# Patient Record
Sex: FEMALE | Race: WHITE | NOT HISPANIC OR LATINO | Employment: FULL TIME | URBAN - METROPOLITAN AREA
[De-identification: names, ages, dates, MRNs, and addresses within clinical notes are randomized per-mention and may not be internally consistent; named-entity substitution may affect disease eponyms.]

---

## 2018-07-06 ENCOUNTER — OFFICE VISIT (OUTPATIENT)
Dept: OPTOMETRY | Facility: CLINIC | Age: 33
End: 2018-07-06
Payer: COMMERCIAL

## 2018-07-06 DIAGNOSIS — H52.01 HYPERMETROPIA OF RIGHT EYE: Primary | ICD-10-CM

## 2018-07-06 DIAGNOSIS — Z98.890 HISTORY OF SURGICAL PROCEDURE ON EYE PROPER USING LASER: ICD-10-CM

## 2018-07-06 DIAGNOSIS — H52.222 REGULAR ASTIGMATISM OF LEFT EYE: ICD-10-CM

## 2018-07-06 DIAGNOSIS — H52.12 MYOPIA OF LEFT EYE: ICD-10-CM

## 2018-07-06 DIAGNOSIS — H04.123 INSUFFICIENCY OF TEAR FILM OF BOTH EYES: ICD-10-CM

## 2018-07-06 PROCEDURE — 92014 COMPRE OPH EXAM EST PT 1/>: CPT | Performed by: OPTOMETRIST

## 2018-07-06 PROCEDURE — 92015 DETERMINE REFRACTIVE STATE: CPT | Performed by: OPTOMETRIST

## 2018-07-06 ASSESSMENT — CUP TO DISC RATIO
OD_RATIO: 0.3
OS_RATIO: 0.3

## 2018-07-06 ASSESSMENT — REFRACTION_WEARINGRX
OS_CYLINDER: SPHERE
OD_AXIS: 070
OD_CYLINDER: +2.75
OD_SPHERE: -1.00
OS_SPHERE: PLANO

## 2018-07-06 ASSESSMENT — VISUAL ACUITY
OD_SC+: -1
OD_SC: 20/40
OS_SC+: -1
OD_PH_SC: 20/40
OD_SC: 20/70
OS_SC: 20/25
METHOD: SNELLEN - LINEAR
OS_SC: 20/20

## 2018-07-06 ASSESSMENT — SLIT LAMP EXAM - LIDS
COMMENTS: NORMAL
COMMENTS: NORMAL

## 2018-07-06 ASSESSMENT — REFRACTION_MANIFEST
OS_CYLINDER: +0.75
OS_SPHERE: -1.50
OS_AXIS: 138
OD_SPHERE: +1.75

## 2018-07-06 ASSESSMENT — EXTERNAL EXAM - RIGHT EYE: OD_EXAM: NORMAL

## 2018-07-06 ASSESSMENT — TONOMETRY
OD_IOP_MMHG: 15
OS_IOP_MMHG: 15
IOP_METHOD: TONOPEN

## 2018-07-06 ASSESSMENT — CONF VISUAL FIELD
OD_NORMAL: 1
OS_NORMAL: 1

## 2018-07-06 ASSESSMENT — EXTERNAL EXAM - LEFT EYE: OS_EXAM: NORMAL

## 2018-07-06 NOTE — PROGRESS NOTES
Chief Complaint   Patient presents with     COMPREHENSIVE EYE EXAM     1 year post op ptk       History of RCE right eye with PTK- 1 year ago  Last Eye Exam: 1.5 years  Dilated Previously: Yes    What are you currently using to see?  does not use glasses or contacts       Distance Vision Acuity: Noticed gradual change in both eyes    Near Vision Acuity: Not satisfied,od eye    Eye Comfort: dry od needs drops every hour  Do you use eye drops? : Yes: otc gel  Occupation or Hobbies: RN [employer]    Mere Forbes Optometric Assistant, A.B.O.C.          Medical, surgical and family histories reviewed and updated 7/6/2018.       OBJECTIVE: See Ophthalmology exam    ASSESSMENT:    ICD-10-CM    1. Hypermetropia of right eye H52.01 REFRACTION   2. Regular astigmatism of left eye H52.222 REFRACTION   3. Myopia of left eye H52.12 REFRACTION   4. History of surgical procedure on eye proper using laser Z98.890 EYE EXAM (SIMPLE-NONBILLABLE)   5. Insufficiency of tear film of both eyes H04.123 EYE EXAM (SIMPLE-NONBILLABLE)     cycloSPORINE (RESTASIS) 0.05 % ophthalmic emulsion      PLAN:     Patient Instructions   Eyeglass prescription given.  Give the glasses 1-2 weeks to adjust to the new prescription.  You may get headaches or eyestrain as your eyes get used to the new prescription.  Sometimes the symptoms get worse before it gets better.  If any problems after 1-2 weeks schedule an appointment for a recheck.      Referral to Dr. Fuchs at [facility] for evaluation right eye if interested in any refractive surgery right eye.    Use 1 drop of Restasis in both eyes in the morning and 1 drop both eyes in the evening.  The drop is cloudy colored and will make your vision a little blurrry after insertion.  The drop may sting a little when you put it in which is normal for some people.  Do not touch the tip of the dropper to anything which contaminates the drop.    Continue using gel drops as  needed.    Return in 2 months for recheck on dry eye or sooner if needed.    Return in 1 year for a complete eye exam or sooner if needed.    Keith Mcmillan, OD

## 2018-07-06 NOTE — MR AVS SNAPSHOT
After Visit Summary   7/6/2018    Promise Conrad    MRN: [id number]           Patient Information     Date Of Birth          1985        Visit Information        Provider Department      7/6/2018 2:40 PM Keith Mcmillan, CIRA [facility]        Today's Diagnoses     Hypermetropia of right eye    -  1    Regular astigmatism of left eye        Myopia of left eye        History of surgical procedure on eye proper using laser        Insufficiency of tear film of both eyes          Care Instructions    Eyeglass prescription given.  Give the glasses 1-2 weeks to adjust to the new prescription.  You may get headaches or eyestrain as your eyes get used to the new prescription.  Sometimes the symptoms get worse before it gets better.  If any problems after 1-2 weeks schedule an appointment for a recheck.      Referral to Dr. Fuchs at [facility] for evaluation right eye if interested in any refractive surgery right eye.    Use 1 drop of Restasis in both eyes in the morning and 1 drop both eyes in the evening.  The drop is cloudy colored and will make your vision a little blurrry after insertion.  The drop may sting a little when you put it in which is normal for some people.  Do not touch the tip of the dropper to anything which contaminates the drop.    Continue using gel drops as needed.    Return in 2 months for recheck on dry eye or sooner if needed.    Return in 1 year for a complete eye exam or sooner if needed.    Keith Mcmillan, CIRA      The affects of the dilating drops last for 4- 6 hours.  You will be more sensitive to light and vision will be blurry up close.  Mydriatic sunglasses were given if needed.      Optometry Providers       Clinic Locations                                 Telephone Number   [facility]  [phone]     [facility] Hours:                [facility] [place] [facility] Hours:       [facility] Hours:   [address]   [address]     [address] [facility]  [place] [address]   [place] [address]    [place] [address]  Phone: [phone]                    Phone: [phone]     Phone: [phone]                      Monday 8:00-7:00                          Monday 8:00-7:00                          Monday 8:00-7:00              Tuesday 8:00-6:00                          Tuesday 8:00-7:00                          Tuesday 8:00-7:00              Wednesday 8:00-6:00                  Wednesday 8:00-7:00                   Wednesday 8:00-7:00      Thursday 8:00-6:00                        Thursday 8:00-7:00                         Thursday 8:00-7:00            Friday 8:00-5:00                              Friday 8:00-5:00                              Friday 8:00-5:00    [facility] Hours:   [address] [place] [address]  [phone]    Monday 8:00-7:00  Tuesday 8:00-7:00  Wednesday 8:00-7:00  Thursday 8:00-7:00  Friday 8:00-5:00  Please log on to [email or web address] to order your contact lenses.  The link is found on the Eye Care and Vision Services page.  As always, Thank you for trusting us with your health care needs!            Follow-ups after your visit        Follow-up notes from your care team     Return in about 2 months (around 9/6/2018), or if symptoms worsen or fail to improve, for Follow Up.      Who to contact     If you have questions or need follow up information about today's clinic visit or your schedule please contact [facility] directly at [phone].  Normal or non-critical lab and imaging results will be communicated to you by MyChart, letter or phone within 4 business days after the clinic has received the results. If you do not hear from us within 7 days, please contact the clinic through MyChart or phone. If you have a critical or  abnormal lab result, we will notify you by phone as soon as possible.  Submit refill requests through ZummZumm or call your pharmacy and they will forward the refill request to us. Please allow 3 business days for your refill to be completed.          Additional Information About Your Visit        ZummZumm Information     ZummZumm is an electronic gateway that provides easy, online access to your medical records. With ZummZumm, you can request a clinic appointment, read your test results, renew a prescription or communicate with your care team.     To sign up for [employer]umm[employer]umm visit the website at [email or web address]   You will be asked to enter the access code listed below, as well as some personal information. Please follow the directions to create your username and password.     Your access code is: [email or web address]  Expires: 10/7/2018  8:56 AM     Your access code will  in 90 days. If you need help or a new code, please contact your [facility] or call [phone] for assistance.        Care EveryWhere ID     This is your Care EveryWhere ID. This could be used by other organizations to access your [place] medical records  [id number]         Blood Pressure from Last 3 Encounters:   14 120/75   14 128/78   14 112/74    Weight from Last 3 Encounters:   14 82.6 kg (182 lb)   14 90.3 kg (199 lb)   14 89.8 kg (198 lb)              We Performed the Following     EYE EXAM (SIMPLE-NONBILLABLE)     REFRACTION          Today's Medication Changes          These changes are accurate as of 18 11:59 PM.  If you have any questions, ask your nurse or doctor.               Start taking these medicines.        Dose/Directions    cycloSPORINE 0.05 % ophthalmic emulsion   Commonly known as:  RESTASIS   Used for:  Insufficiency of tear film of both eyes   Started by:  Keith Mcmillan, OD        Dose:  1 drop   Place 1 drop into both eyes 2 times daily   Quantity:   2 Box   Refills:  11            Where to get your medicines      These medications were sent to [facility] - [place], [place] - [address] AT [facility] [address]  [address], [place] [facility] [address]     Phone:  [phone]     cycloSPORINE 0.05 % ophthalmic emulsion                Primary Care Provider Office Phone # Fax #    Alexsander Celaya [phone]       [facility] [address] [place] [address]        Equal Access to Services     [facility]: Hadii aad ku hadasho Soomaali, waaxda luqadaha, qaybta kaalmada adeegyada, waxay alan hayaan india echevarria . So [facility] [phone].    ATENCIÓN: Si habla español, tiene a castro disposición servicios gratuitos de asistencia lingüística. [facility] [phone].    We comply with applicable federal civil rights laws and [place] laws. We do not discriminate on the basis of race, color, national origin, age, disability, sex, sexual orientation, or gender identity.            Thank you!     Thank you for choosing [facility]  for your care. Our goal is always to provide you with excellent care. Hearing back from our patients is one way we can continue to improve our services. Please take a few minutes to complete the written survey that you may receive in the mail after your visit with us. Thank you!             Your Updated Medication List - Protect others around you: Learn how to safely use, store and throw away your medicines at www.disposemymeds.org.          This list is accurate as of 7/6/18 11:59 PM.  Always use your most recent med list.                   Brand Name Dispense Instructions for use Diagnosis    Calcium 200 MG Tabs      Take 2 tablets by mouth 2 times daily        cycloSPORINE 0.05 % ophthalmic emulsion    RESTASIS    2 Box    Place 1 drop into both eyes 2 times daily    Insufficiency of tear film of both eyes       multivitamin, therapeutic with minerals Tabs  tablet      Take 1 tablet by mouth daily        sodium chloride 2 % ophthalmic solution    BHAVNA 128    1 Bottle    Place 1 drop into the right eye 4 times daily as needed for dry eyes    Recurrent erosion of cornea, right       ZANTAC 150 MAXIMUM STRENGTH PO      Take 1 tablet by mouth 2 times daily

## 2018-07-09 RX ORDER — CYCLOSPORINE 0.5 MG/ML
1 EMULSION OPHTHALMIC 2 TIMES DAILY
Qty: 2 BOX | Refills: 11 | Status: SHIPPED | OUTPATIENT
Start: 2018-07-09 | End: 2019-07-09

## 2018-07-09 NOTE — PATIENT INSTRUCTIONS
Eyeglass prescription given.  Give the glasses 1-2 weeks to adjust to the new prescription.  You may get headaches or eyestrain as your eyes get used to the new prescription.  Sometimes the symptoms get worse before it gets better.  If any problems after 1-2 weeks schedule an appointment for a recheck.      Referral to Dr. Fuchs at [facility] for evaluation right eye if interested in any refractive surgery right eye.    Use 1 drop of Restasis in both eyes in the morning and 1 drop both eyes in the evening.  The drop is cloudy colored and will make your vision a little blurrry after insertion.  The drop may sting a little when you put it in which is normal for some people.  Do not touch the tip of the dropper to anything which contaminates the drop.    Continue using gel drops as needed.    Return in 2 months for recheck on dry eye or sooner if needed.    Return in 1 year for a complete eye exam or sooner if needed.    Keith Mcmillan, OD      The affects of the dilating drops last for 4- 6 hours.  You will be more sensitive to light and vision will be blurry up close.  Mydriatic sunglasses were given if needed.      Optometry Providers       Clinic Locations                                 Telephone Number   Dr. Ольга Adan [place]   [place]    [place]   [place]   [place] [phone]     [facility] [facility] Hours:                [facility] [place] [facility] Hours:       [facility] Hours:   [address]   [address] [address]     [address] [facility] [place] [address]   [place] [address]    [place] [address]  Phone: [phone]                    Phone: [phone]     Phone: [phone]                      Monday 8:00-7:00                          Monday 8:00-7:00                          Monday 8:00-7:00              Tuesday 8:00-6:00                          Tuesday 8:00-7:00                           Tuesday 8:00-7:00              Wednesday 8:00-6:00                  Wednesday 8:00-7:00                   Wednesday 8:00-7:00      Thursday 8:00-6:00                        Thursday 8:00-7:00                         Thursday 8:00-7:00            Friday 8:00-5:00                              Friday 8:00-5:00                              Friday 8:00-5:00    [facility] Hours:   [address] [place] [address]  [phone]    Monday 8:00-7:00  Tuesday 8:00-7:00  Wednesday 8:00-7:00  Thursday 8:00-7:00  Friday 8:00-5:00  Please log on to [email or web address] to order your contact lenses.  The link is found on the Eye Care and Vision Services page.  As always, Thank you for trusting us with your health care needs!

## 2021-11-15 ENCOUNTER — PATIENT OUTREACH (OUTPATIENT)
Dept: CARE COORDINATION | Facility: CLINIC | Age: 36
End: 2021-11-15

## 2021-11-15 NOTE — PROGRESS NOTES
"Clinical Product Navigator RN reviewed chart; patient on payer product coverage, received request for Care Coordination referral due to : \"Neoplasm of unspecified behavior of brain\".  Review results: Patient has not had any recent (> 3 years) care with any [facility] providers.      Will notify health plan CM team of above with request to outreach; if patient desires to establish care within [facility], this writer is available to assist.       Kayla Palomo RN/Clinical Product Navigator    "

## 2023-04-30 ENCOUNTER — HEALTH MAINTENANCE LETTER (OUTPATIENT)
Age: 38
End: 2023-04-30

## 2023-10-16 ENCOUNTER — OFFICE VISIT (OUTPATIENT)
Dept: OPTOMETRY | Facility: CLINIC | Age: 38
End: 2023-10-16
Payer: COMMERCIAL

## 2023-10-16 DIAGNOSIS — Z01.00 EXAMINATION OF EYES AND VISION: Primary | ICD-10-CM

## 2023-10-16 DIAGNOSIS — H52.12 MYOPIA OF LEFT EYE: ICD-10-CM

## 2023-10-16 DIAGNOSIS — G93.0 EPIDERMOID CYST OF BRAIN: ICD-10-CM

## 2023-10-16 DIAGNOSIS — H52.222 REGULAR ASTIGMATISM OF LEFT EYE: ICD-10-CM

## 2023-10-16 DIAGNOSIS — H52.01 HYPEROPIA OF RIGHT EYE: ICD-10-CM

## 2023-10-16 DIAGNOSIS — H02.889 MEIBOMIAN GLAND DYSFUNCTION: ICD-10-CM

## 2023-10-16 DIAGNOSIS — Z98.890 HISTORY OF PHOTOTHERAPEUTIC KERATECTOMY: ICD-10-CM

## 2023-10-16 PROCEDURE — 92004 COMPRE OPH EXAM NEW PT 1/>: CPT | Performed by: OPTOMETRIST

## 2023-10-16 PROCEDURE — 92015 DETERMINE REFRACTIVE STATE: CPT | Performed by: OPTOMETRIST

## 2023-10-16 ASSESSMENT — KERATOMETRY
OS_AXISANGLE2_DEGREES: 013
OS_K2POWER_DIOPTERS: 43.75
OD_K1POWER_DIOPTERS: 40.75
OD_AXISANGLE_DEGREES: 072
OS_AXISANGLE_DEGREES: 103
OD_K2POWER_DIOPTERS: 41.75
OD_AXISANGLE2_DEGREES: 162
OS_K1POWER_DIOPTERS: 43.00

## 2023-10-16 ASSESSMENT — CONF VISUAL FIELD
OD_INFERIOR_TEMPORAL_RESTRICTION: 0
OS_INFERIOR_TEMPORAL_RESTRICTION: 0
OS_SUPERIOR_NASAL_RESTRICTION: 0
OD_SUPERIOR_NASAL_RESTRICTION: 0
OS_SUPERIOR_TEMPORAL_RESTRICTION: 0
OD_NORMAL: 1
OS_INFERIOR_NASAL_RESTRICTION: 0
OS_NORMAL: 1
OD_INFERIOR_NASAL_RESTRICTION: 0
OD_SUPERIOR_TEMPORAL_RESTRICTION: 0

## 2023-10-16 ASSESSMENT — VISUAL ACUITY
OS_CC: 20/20
OS_SC+: -1
CORRECTION_TYPE: GLASSES
METHOD: SNELLEN - LINEAR
OS_CC: 20/20
OD_CC: 20/25
OD_SC: 20/40
OD_CC: 20/20
OS_SC: 20/40

## 2023-10-16 ASSESSMENT — REFRACTION_WEARINGRX
OS_SPHERE: -1.50
OS_AXIS: 138
OD_SPHERE: +1.75
OS_CYLINDER: +0.75
SPECS_TYPE: SVL

## 2023-10-16 ASSESSMENT — EXTERNAL EXAM - LEFT EYE: OS_EXAM: NORMAL

## 2023-10-16 ASSESSMENT — REFRACTION_MANIFEST
OS_SPHERE: -1.50
OD_SPHERE: +1.75
OD_CYLINDER: SPHERE
OS_AXIS: 116
OS_CYLINDER: +0.50
METHOD_AUTOREFRACTION: 1

## 2023-10-16 ASSESSMENT — TONOMETRY
OD_IOP_MMHG: 17
OS_IOP_MMHG: 21
IOP_METHOD: TONOPEN

## 2023-10-16 ASSESSMENT — CUP TO DISC RATIO
OS_RATIO: 0.15
OD_RATIO: 0.15

## 2023-10-16 ASSESSMENT — EXTERNAL EXAM - RIGHT EYE: OD_EXAM: NORMAL

## 2023-10-16 ASSESSMENT — SLIT LAMP EXAM - LIDS
COMMENTS: MEIBOMIAN GLAND DYSFUNCTION
COMMENTS: MEIBOMIAN GLAND DYSFUNCTION

## 2023-10-16 NOTE — PROGRESS NOTES
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 Stable enhancement along the margins of the surgical tunnel consistent with granulation tissue.  Compared to the more recent exam of 10/12/2022, stable size and appearance of the residual epidermoid cyst within the posterior aspect of 3rd ventricle measuring 3.2 x 3.3 cm (transverse by AP; series 7, image 16). Again noted is corresponding restricted diffusion. This mass extends inferiorly into the posterior aspect of the right quadrigeminal cistern and right ambient cisterns. Stable asymmetric dilatation of the right lateral ventricle.  No new abnormal enhancement or enhancing lesions.\"     Last Eye Exam: 7-6-2018   Dilated Previously: Yes    What are you currently using to see?  glasses       Distance Vision Acuity: Satisfied with vision    Near Vision Acuity: Satisfied with vision while reading  with glasses    Eye Comfort: good  Do you use eye drops? : Yes: evaristo 128 at night, uses moisture goggles at night, Systane/Ocusoft wipes.  Artificial tears as needed- sometimes 3 x day sometimes 15.  Occupation or Hobbies: RN     History of RCE and PTK right eye 9/15/2016- Dr. Finesse Forbes Optometric Assistant, A.B.O.C.      Medical, surgical and family histories reviewed and updated 10/16/2023.       OBJECTIVE: See Ophthalmology exam    ASSESSMENT:    ICD-10-CM    1. Examination of eyes and vision  Z01.00 EYE EXAM (SIMPLE-NONBILLABLE)      2. Hyperopia of right eye  H52.01 REFRACTION      3. " Myopia of left eye  H52.12 REFRACTION      4. Regular astigmatism of left eye  H52.222 REFRACTION      5. Epidermoid cyst of brain  G93.0 EYE EXAM (SIMPLE-NONBILLABLE)     Adult Eye  Referral      6. History of phototherapeutic keratectomy  Z98.890 EYE EXAM (SIMPLE-NONBILLABLE)      7. Meibomian gland dysfunction  H02.889 EYE EXAM (SIMPLE-NONBILLABLE)          PLAN:     Patient Instructions   Eyeglass prescription given.  No change in eyeglass prescription.    Referral to Dr. Otoniel De Oliveira at the [facility] for baseline visual field.  Ok to follow at [place] for annual eye exams   and visual field.    Heat to the eyes daily for 10-15 minutes nightly with warm washcloth or reusable gel masks from the pharmacy or  Grapeshot heat masks can be purchased at Amazon.    Axel ointment at night right eye.    Non preserved artificial tears- 1 drop both eyes 4 x day.    Ocusoft Hypochlor- spray solution onto cotton pad.  Close eyes and gently apply to eyelids and eyelashes using side to side motion.  Use morning and evening.    Keith Mcmillan, OD

## 2023-10-16 NOTE — LETTER
"    10/16/2023         RE: Promise Conrad  [address]  [facility] [address]        Dear Colleague,    Thank you for referring your patient, Promise Conrad, to the [facility]. Please see a copy of my visit note below.    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 Stable enhancement along the margins of the surgical tunnel consistent with granulation tissue.  Compared to the more recent exam of 10/12/2022, stable size and appearance of the residual epidermoid cyst within the posterior aspect of 3rd ventricle measuring 3.2 x 3.3 cm (transverse by AP; series 7, image 16). Again noted is corresponding restricted diffusion. This mass extends inferiorly into the posterior aspect of the right quadrigeminal cistern and right ambient cisterns. Stable asymmetric dilatation of the right lateral ventricle.  No new abnormal enhancement or enhancing lesions.\"     Last Eye Exam: 7-6-2018   Dilated Previously: Yes    What are you currently using to see?  glasses       Distance Vision Acuity: Satisfied with vision    Near Vision Acuity: Satisfied with vision while reading  with glasses    Eye Comfort: good  Do you use eye drops? : Yes: evaristo 128 at night, uses moisture goggles at night, Systane/Ocusoft wipes.  Artificial tears as needed- sometimes 3 x day sometimes 15.  Occupation or Hobbies: RN     History of RCE and PTK right eye 9/15/2016- Dr. Finesse Forbes Optometric Assistant, A.B.O.C.      Medical, " surgical and family histories reviewed and updated 10/16/2023.       OBJECTIVE: See Ophthalmology exam    ASSESSMENT:    ICD-10-CM    1. Examination of eyes and vision  Z01.00 EYE EXAM (SIMPLE-NONBILLABLE)      2. Hyperopia of right eye  H52.01 REFRACTION      3. Myopia of left eye  H52.12 REFRACTION      4. Regular astigmatism of left eye  H52.222 REFRACTION      5. Epidermoid cyst of brain  G93.0 EYE EXAM (SIMPLE-NONBILLABLE)     Adult Eye  Referral      6. History of phototherapeutic keratectomy  Z98.890 EYE EXAM (SIMPLE-NONBILLABLE)      7. Meibomian gland dysfunction  H02.889 EYE EXAM (SIMPLE-NONBILLABLE)          PLAN:     Patient Instructions   Eyeglass prescription given.  No change in eyeglass prescription.    Referral to Dr. Otoniel De Oliveira at the [facility] for baseline visual field.  Ok to follow at [place] for annual eye exams   and visual field.    Heat to the eyes daily for 10-15 minutes nightly with warm washcloth or reusable gel masks from the pharmacy or  Beststudy heat masks can be purchased at Amazon.    Axel ointment at night right eye.    Non preserved artificial tears- 1 drop both eyes 4 x day.    Ocusoft Hypochlor- spray solution onto cotton pad.  Close eyes and gently apply to eyelids and eyelashes using side to side motion.  Use morning and evening.    Keith Mcmillan OD           Again, thank you for allowing me to participate in the care of your patient.        Sincerely,        Keith Mcmillan OD

## 2023-10-16 NOTE — PATIENT INSTRUCTIONS
Eyeglass prescription given.  No change in eyeglass prescription.    Referral to Dr. Otoniel De Oliveira at the [facility] for baseline visual field.  Ok to follow at [place] for annual eye exams   and visual field.    Heat to the eyes daily for 10-15 minutes nightly with warm washcloth or reusable gel masks from the pharmacy or  Likeeds heat masks can be purchased at Amazon.    Axel ointment at night right eye.    Non preserved artificial tears- 1 drop both eyes 4 x day.    Ocusoft Hypochlor- spray solution onto cotton pad.  Close eyes and gently apply to eyelids and eyelashes using side to side motion.  Use morning and evening.    Keith Mcmillan, OD    The affects of the dilating drops last for 4- 6 hours.  You will be more sensitive to light and vision will be blurry up close.  Do not drive if you do not feel comfortable.  Mydriatic sunglasses were given if needed.      Optometry Providers       Clinic Locations                                 Telephone Number   Dr. Jackie [facility]an [facility] [phone]     [facility] Hours:                [facility] Hours:       [facility] Hours:   [address]   [address] [address]     [address] [place] [address]   [place] [address]    [place] [address]  Phone: [phone]                    Phone: [phone]     Phone: [phone]                      Monday 8:00-6:00                          Monday 8:00-6:00                          Monday 8:00-6:00              Tuesday 8:00-6:00                          Tuesday 8:00-6:00                          Tuesday 8:00-6:00              Wednesday 8:00-6:00                  Wednesday 8:00-6:00                   Wednesday 8:00-6:00      Thursday 8:00-6:00                        Thursday 8:00-6:00                          Thursday 8:00-6:00            Friday 8:00-5:00                              Friday 8:00-5:00                              Friday 8:00-5:00    [facility] Hours:   [address] [place] [address]  [phone]    Monday 9:00-6:00  Tuesday 9:00-6:00  Wednesday 9:00-6:00  Thursday 9:00-6:00  Friday 9:00-5:00  As always, Thank you for trusting us with your health care needs!

## 2023-12-08 ENCOUNTER — TELEPHONE (OUTPATIENT)
Dept: OPHTHALMOLOGY | Facility: CLINIC | Age: 38
End: 2023-12-08

## 2023-12-08 ENCOUNTER — OFFICE VISIT (OUTPATIENT)
Dept: OPHTHALMOLOGY | Facility: CLINIC | Age: 38
End: 2023-12-08
Payer: COMMERCIAL

## 2023-12-08 ENCOUNTER — TRANSFERRED RECORDS (OUTPATIENT)
Dept: HEALTH INFORMATION MANAGEMENT | Facility: CLINIC | Age: 38
End: 2023-12-08

## 2023-12-08 DIAGNOSIS — G93.0 EPIDERMOID CYST OF BRAIN: Primary | ICD-10-CM

## 2023-12-08 DIAGNOSIS — H53.462 LEFT HOMONYMOUS SUPERIOR QUADRANTANOPIA: ICD-10-CM

## 2023-12-08 PROCEDURE — 92083 EXTENDED VISUAL FIELD XM: CPT | Performed by: OPTOMETRIST

## 2023-12-08 PROCEDURE — 92012 INTRM OPH EXAM EST PATIENT: CPT | Performed by: OPTOMETRIST

## 2023-12-08 ASSESSMENT — REFRACTION_WEARINGRX
OS_AXIS: 138
SPECS_TYPE: SVL
OS_CYLINDER: +0.75
OS_SPHERE: -1.50
OD_SPHERE: +1.75

## 2023-12-08 ASSESSMENT — CONF VISUAL FIELD: COMMENTS: HVF 24-2 PERFORMED TODAY

## 2023-12-08 ASSESSMENT — TONOMETRY
OD_IOP_MMHG: 12
IOP_METHOD: ICARE
OS_IOP_MMHG: 16

## 2023-12-08 ASSESSMENT — VISUAL ACUITY
CORRECTION_TYPE: GLASSES
OD_CC: 20/20
OS_CC: 20/25
METHOD: SNELLEN - LINEAR
OS_CC+: +2

## 2023-12-08 NOTE — PROGRESS NOTES
Assessment/Plan  (G93.0) Epidermoid cyst of brain  (primary encounter diagnosis)  Comment: History of resection in 9/2021. Stable if not slightly improved visual field defect when compared with 11/2021 imaging study.   Plan: Continue care with neurology. Monitor annually with complete exam and visual field. Return to clinic if vision changes are noted.     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 Return to clinic each year.       Complete documentation of historical and exam elements from today's encounter can  be found in the full encounter summary report (not reduplicated in this progress  note). I personally obtained the chief complaint(s) and history of present illness. I  confirmed and edited as necessary the review of systems, past medical/surgical  history, family history, social history, and examination findings as documented by  others; and I examined the patient myself. I personally reviewed the relevant tests,  images, and reports as documented above. I formulated and edited as necessary the  assessment and plan and discussed the findings and management plan with the  patient and family.    Otoniel De Oliveira, OD

## 2023-12-08 NOTE — NURSING NOTE
"Chief Complaints and History of Present Illnesses   Patient presents with    Visual Field Defect Evaluation       Chief Complaint(s) and History of Present Illness(es)       Visual Field Defect Evaluation              Laterality: both eyes              Comments    Patient referred by Dr Mcmillan for HVF testing, S/P brain tumor removed 9-2021.  Patient states there is no noticeable vision loss that she can perceive.   Wears glasses.   Uses [employer] \"all day\"   No other concerns for today.   No pain, no flashes, no floaters.                        Gonzalo Joyce, Ophthalmic Assistant    "

## 2023-12-08 NOTE — TELEPHONE ENCOUNTER
Left Voicemail (1st Attempt) for the patient to call back and schedule the following:    Appointment type: return  Provider: dr. De Oliveira   Return date: 12/8/2024  Specialty phone number: [phone]   Additonal Notes: Return in about 1 year (around 12/8/2024) for Comprehensive Exam, Visual Field.     Stephania johansen Procedure   Orthopedics, Podiatry, Sports Medicine, Ent ,Eye , Audiology, Adult Endocrine & Diabetes, Nutrition & Medication Therapy Management Specialties   [facility]

## 2024-06-10 ENCOUNTER — TELEPHONE (OUTPATIENT)
Dept: OPHTHALMOLOGY | Facility: CLINIC | Age: 39
End: 2024-06-10
Payer: COMMERCIAL

## 2024-06-10 NOTE — TELEPHONE ENCOUNTER
Left Voicemail (2nd Attempt) for the patient to call back and schedule the following:    Appointment type: return  Provider: dr. De Oliveira   Return date: 12/8/2024   Specialty phone number: [phone]   Additonal Notes: Return in about 1 year (around 12/8/2024) for Comprehensive Exam, Visual Field.     Stephania johansen Complex   Orthopedics, Podiatry, Sports Medicine, Ent ,Eye , Audiology, Adult Endocrine & Diabetes, Nutrition & Medication Therapy Management Specialties   [facility]

## 2024-07-07 ENCOUNTER — HEALTH MAINTENANCE LETTER (OUTPATIENT)
Age: 39
End: 2024-07-07

## 2024-09-15 ENCOUNTER — HEALTH MAINTENANCE LETTER (OUTPATIENT)
Age: 39
End: 2024-09-15

## 2025-07-13 ENCOUNTER — HEALTH MAINTENANCE LETTER (OUTPATIENT)
Age: 40
End: 2025-07-13